# Patient Record
Sex: MALE | Race: WHITE | NOT HISPANIC OR LATINO | URBAN - METROPOLITAN AREA
[De-identification: names, ages, dates, MRNs, and addresses within clinical notes are randomized per-mention and may not be internally consistent; named-entity substitution may affect disease eponyms.]

---

## 2018-03-02 ENCOUNTER — IMPORTED ENCOUNTER (OUTPATIENT)
Dept: URBAN - METROPOLITAN AREA CLINIC 50 | Facility: CLINIC | Age: 68
End: 2018-03-02

## 2018-06-01 ENCOUNTER — IMPORTED ENCOUNTER (OUTPATIENT)
Dept: URBAN - METROPOLITAN AREA CLINIC 50 | Facility: CLINIC | Age: 68
End: 2018-06-01

## 2019-06-11 ENCOUNTER — APPOINTMENT (RX ONLY)
Dept: URBAN - METROPOLITAN AREA CLINIC 86 | Facility: CLINIC | Age: 69
Setting detail: DERMATOLOGY
End: 2019-06-11

## 2019-06-11 DIAGNOSIS — D22 MELANOCYTIC NEVI: ICD-10-CM

## 2019-06-11 DIAGNOSIS — L82.1 OTHER SEBORRHEIC KERATOSIS: ICD-10-CM

## 2019-06-11 DIAGNOSIS — L57.0 ACTINIC KERATOSIS: ICD-10-CM

## 2019-06-11 PROBLEM — D48.5 NEOPLASM OF UNCERTAIN BEHAVIOR OF SKIN: Status: ACTIVE | Noted: 2019-06-11

## 2019-06-11 PROBLEM — D22.5 MELANOCYTIC NEVI OF TRUNK: Status: ACTIVE | Noted: 2019-06-11

## 2019-06-11 PROCEDURE — ? BIOPSY BY SHAVE METHOD

## 2019-06-11 PROCEDURE — 99203 OFFICE O/P NEW LOW 30 MIN: CPT | Mod: 25

## 2019-06-11 PROCEDURE — 17000 DESTRUCT PREMALG LESION: CPT | Mod: 59

## 2019-06-11 PROCEDURE — ? LIQUID NITROGEN

## 2019-06-11 PROCEDURE — ? INVENTORY

## 2019-06-11 PROCEDURE — ? COUNSELING

## 2019-06-11 PROCEDURE — 11102 TANGNTL BX SKIN SINGLE LES: CPT

## 2019-06-11 PROCEDURE — 11103 TANGNTL BX SKIN EA SEP/ADDL: CPT

## 2019-06-11 ASSESSMENT — LOCATION SIMPLE DESCRIPTION DERM
LOCATION SIMPLE: RIGHT CHEEK
LOCATION SIMPLE: UPPER BACK
LOCATION SIMPLE: LEFT UPPER BACK

## 2019-06-11 ASSESSMENT — LOCATION DETAILED DESCRIPTION DERM
LOCATION DETAILED: LEFT MID-UPPER BACK
LOCATION DETAILED: SUPERIOR THORACIC SPINE
LOCATION DETAILED: RIGHT SUPERIOR PREAURICULAR CHEEK

## 2019-06-11 ASSESSMENT — LOCATION ZONE DERM
LOCATION ZONE: TRUNK
LOCATION ZONE: FACE

## 2019-06-11 NOTE — PROCEDURE: MIPS QUALITY
Quality 130: Documentation Of Current Medications In The Medical Record: Current Medications Documented
Detail Level: Detailed
Quality 226: Preventive Care And Screening: Tobacco Use: Screening And Cessation Intervention: Patient screened for tobacco use and is an ex/non-smoker
Quality 111:Pneumonia Vaccination Status For Older Adults: Pneumococcal Vaccination Previously Received
Quality 431: Preventive Care And Screening: Unhealthy Alcohol Use - Screening: Patient screened for unhealthy alcohol use using a single question and scores less than 2 times per year

## 2019-06-11 NOTE — PROCEDURE: BIOPSY BY SHAVE METHOD
Anesthesia Volume In Cc (Will Not Render If 0): 0.5
Silver Nitrate Text: The wound bed was treated with silver nitrate after the biopsy was performed.
Biopsy Type: H and E
Notification Instructions: Patient will be notified of biopsy results. However, patient instructed to call the office if not contacted within 2 weeks.
X Size Of Lesion In Cm: 0
Depth Of Biopsy: dermis
Curettage Text: The wound bed was treated with curettage after the biopsy was performed.
Lab: 6
Bill For Surgical Tray: no
Wound Care: Petrolatum
Consent: Written consent was obtained and risks were reviewed including but not limited to scarring, infection, bleeding, scabbing, incomplete removal, nerve damage and allergy to anesthesia.
Cryotherapy Text: The wound bed was treated with cryotherapy after the biopsy was performed.
Anesthesia Type: 1% lidocaine with epinephrine
Lab Facility: 3
Detail Level: Detailed
Electrodesiccation Text: The wound bed was treated with electrodesiccation after the biopsy was performed.
Biopsy Method: Dermablade
Hemostasis: Electrocautery
Was A Bandage Applied: Yes
Type Of Destruction Used: Curettage
Electrodesiccation And Curettage Text: The wound bed was treated with electrodesiccation and curettage after the biopsy was performed.
Billing Type: Third-Party Bill
Dressing: bandage
Post-Care Instructions: I reviewed with the patient in detail post-care instructions. Patient is to keep the biopsy site dry overnight, and then apply bacitracin twice daily until healed. Patient may apply hydrogen peroxide soaks to remove any crusting.

## 2019-07-01 ENCOUNTER — APPOINTMENT (RX ONLY)
Dept: URBAN - METROPOLITAN AREA CLINIC 86 | Facility: CLINIC | Age: 69
Setting detail: DERMATOLOGY
End: 2019-07-01

## 2019-07-01 PROBLEM — C44.311 BASAL CELL CARCINOMA OF SKIN OF NOSE: Status: ACTIVE | Noted: 2019-07-01

## 2019-07-01 PROCEDURE — 17311 MOHS 1 STAGE H/N/HF/G: CPT

## 2019-07-01 PROCEDURE — ? MOHS SURGERY

## 2019-07-01 PROCEDURE — ? PRESCRIPTION

## 2019-07-01 RX ORDER — DOXYCYCLINE 100 MG/1
TABLET, FILM COATED ORAL QD
Qty: 7 | Refills: 0 | Status: ERX | COMMUNITY
Start: 2019-07-01

## 2019-07-01 RX ORDER — IBUPROFEN 800 MG/1
TABLET ORAL
Qty: 20 | Refills: 0 | Status: ERX | COMMUNITY
Start: 2019-07-01

## 2019-07-01 RX ADMIN — IBUPROFEN: 800 TABLET ORAL at 00:00

## 2019-07-01 RX ADMIN — DOXYCYCLINE: 100 TABLET, FILM COATED ORAL at 00:00

## 2019-07-01 NOTE — PROCEDURE: MOHS SURGERY

## 2020-08-14 ENCOUNTER — IMPORTED ENCOUNTER (OUTPATIENT)
Dept: URBAN - METROPOLITAN AREA CLINIC 50 | Facility: CLINIC | Age: 70
End: 2020-08-14

## 2020-09-18 ENCOUNTER — IMPORTED ENCOUNTER (OUTPATIENT)
Dept: URBAN - METROPOLITAN AREA CLINIC 50 | Facility: CLINIC | Age: 70
End: 2020-09-18

## 2021-04-17 ASSESSMENT — VISUAL ACUITY
OD_CC: J1@ 17 IN
OD_CC: 20/30-2
OD_SC: 20/50-2
OD_CC: J3@ 14 IN
OS_SC: 20/20
OS_CC: J3@ 14 IN
OD_SC: 20/30+2
OD_BAT: 20/50
OS_BAT: 20/40-1
OD_PH: 20/50
OS_CC: J1@ 17 IN
OD_OTHER: 20/50. 20/200.
OS_CC: 20/30
OS_SC: 20/400
OS_PH: 20/50-1

## 2021-04-17 ASSESSMENT — TONOMETRY
OD_IOP_MMHG: 16
OS_IOP_MMHG: 17
OS_IOP_MMHG: 16
OS_IOP_MMHG: 16
OD_IOP_MMHG: 15
OD_IOP_MMHG: 15

## 2021-04-17 ASSESSMENT — PACHYMETRY
OD_CT_UM: 567
OS_CT_UM: 570

## 2021-09-15 ENCOUNTER — PREPPED CHART (OUTPATIENT)
Dept: URBAN - METROPOLITAN AREA CLINIC 52 | Facility: CLINIC | Age: 71
End: 2021-09-15

## 2021-09-17 ENCOUNTER — COMPREHENSIVE EXAM (OUTPATIENT)
Dept: URBAN - METROPOLITAN AREA CLINIC 52 | Facility: CLINIC | Age: 71
End: 2021-09-17

## 2021-09-17 DIAGNOSIS — H26.491: ICD-10-CM

## 2021-09-17 DIAGNOSIS — H35.363: ICD-10-CM

## 2021-09-17 DIAGNOSIS — H40.013: ICD-10-CM

## 2021-09-17 DIAGNOSIS — H35.372: ICD-10-CM

## 2021-09-17 PROCEDURE — 92014 COMPRE OPH EXAM EST PT 1/>: CPT

## 2021-09-17 PROCEDURE — 92134 CPTRZ OPH DX IMG PST SGM RTA: CPT

## 2021-09-17 RX ORDER — BRIMONIDINE TARTRATE 2 MG/MG
1 SOLUTION/ DROPS OPHTHALMIC TWICE A DAY
Start: 2021-09-17

## 2021-09-17 ASSESSMENT — VISUAL ACUITY
OS_CC: 20/30
OD_GLARE: 20/400
OD_PH: 20/40
OD_GLARE: 20/40
OD_CC: 20/50
OU_CC: J1+ @ 16 INCHES

## 2021-09-17 ASSESSMENT — TONOMETRY
OS_IOP_MMHG: 15
OD_IOP_MMHG: 15

## 2021-09-17 NOTE — PATIENT DISCUSSION
2020 HVF shows loss of peripheral vision OD, recommended at that time patient start GTT. Patient states he stopped GTT on his own. Recommend patient restart GTT OD and retake HVF OU. Patient given sample of Brimodine to use OD BID with written instructions. Will re check pressure in 1 month with testing.

## 2021-10-29 ENCOUNTER — FOLLOW UP (OUTPATIENT)
Dept: URBAN - METROPOLITAN AREA CLINIC 52 | Facility: CLINIC | Age: 71
End: 2021-10-29

## 2021-10-29 DIAGNOSIS — H40.013: ICD-10-CM

## 2021-10-29 PROCEDURE — 92133 CPTRZD OPH DX IMG PST SGM ON: CPT

## 2021-10-29 PROCEDURE — 92012 INTRM OPH EXAM EST PATIENT: CPT

## 2021-10-29 PROCEDURE — 92083 EXTENDED VISUAL FIELD XM: CPT

## 2021-10-29 ASSESSMENT — TONOMETRY
OS_IOP_MMHG: 13
OD_IOP_MMHG: 10

## 2021-10-29 NOTE — PATIENT DISCUSSION
Due to a high number of fixation losses will have patient retake HVF OU. Continue current management.

## 2022-03-29 ENCOUNTER — DIAGNOSTICS ONLY (OUTPATIENT)
Dept: URBAN - METROPOLITAN AREA CLINIC 48 | Facility: CLINIC | Age: 72
End: 2022-03-29

## 2022-03-29 DIAGNOSIS — H40.013: ICD-10-CM

## 2022-03-29 PROCEDURE — 92133 CPTRZD OPH DX IMG PST SGM ON: CPT

## 2022-03-29 PROCEDURE — 92083 EXTENDED VISUAL FIELD XM: CPT

## 2022-04-15 ENCOUNTER — FOLLOW UP (OUTPATIENT)
Dept: URBAN - METROPOLITAN AREA CLINIC 52 | Facility: CLINIC | Age: 72
End: 2022-04-15

## 2022-04-15 DIAGNOSIS — H40.013: ICD-10-CM

## 2022-04-15 PROCEDURE — 92012 INTRM OPH EXAM EST PATIENT: CPT

## 2022-04-15 ASSESSMENT — VISUAL ACUITY
OD_CC: 20/25
OS_CC: 20/25

## 2022-04-15 ASSESSMENT — TONOMETRY
OD_IOP_MMHG: 11
OS_IOP_MMHG: 13

## 2022-12-05 ENCOUNTER — APPOINTMENT (RX ONLY)
Dept: URBAN - METROPOLITAN AREA CLINIC 86 | Facility: CLINIC | Age: 72
Setting detail: DERMATOLOGY
End: 2022-12-05

## 2022-12-05 DIAGNOSIS — L81.4 OTHER MELANIN HYPERPIGMENTATION: ICD-10-CM

## 2022-12-05 DIAGNOSIS — D22 MELANOCYTIC NEVI: ICD-10-CM

## 2022-12-05 DIAGNOSIS — D18.0 HEMANGIOMA: ICD-10-CM

## 2022-12-05 DIAGNOSIS — L82.1 OTHER SEBORRHEIC KERATOSIS: ICD-10-CM

## 2022-12-05 DIAGNOSIS — Z85.828 PERSONAL HISTORY OF OTHER MALIGNANT NEOPLASM OF SKIN: ICD-10-CM

## 2022-12-05 DIAGNOSIS — L57.8 OTHER SKIN CHANGES DUE TO CHRONIC EXPOSURE TO NONIONIZING RADIATION: ICD-10-CM

## 2022-12-05 DIAGNOSIS — L57.0 ACTINIC KERATOSIS: ICD-10-CM

## 2022-12-05 PROBLEM — D48.5 NEOPLASM OF UNCERTAIN BEHAVIOR OF SKIN: Status: ACTIVE | Noted: 2022-12-05

## 2022-12-05 PROBLEM — D22.5 MELANOCYTIC NEVI OF TRUNK: Status: ACTIVE | Noted: 2022-12-05

## 2022-12-05 PROBLEM — D18.01 HEMANGIOMA OF SKIN AND SUBCUTANEOUS TISSUE: Status: ACTIVE | Noted: 2022-12-05

## 2022-12-05 PROCEDURE — ? FULL BODY SKIN EXAM

## 2022-12-05 PROCEDURE — 99203 OFFICE O/P NEW LOW 30 MIN: CPT

## 2022-12-05 PROCEDURE — ? SUNSCREEN RECOMMENDATIONS

## 2022-12-05 PROCEDURE — ? TREATMENT REGIMEN

## 2022-12-05 PROCEDURE — ? COUNSELING

## 2022-12-05 PROCEDURE — ? DEFER

## 2022-12-05 ASSESSMENT — LOCATION ZONE DERM
LOCATION ZONE: NOSE
LOCATION ZONE: NOSE
LOCATION ZONE: ARM
LOCATION ZONE: TRUNK
LOCATION ZONE: ARM

## 2022-12-05 ASSESSMENT — LOCATION DETAILED DESCRIPTION DERM
LOCATION DETAILED: UMBILICUS
LOCATION DETAILED: LEFT NASAL ALA
LOCATION DETAILED: LEFT DISTAL DORSAL FOREARM
LOCATION DETAILED: LEFT DISTAL DORSAL FOREARM
LOCATION DETAILED: RIGHT MEDIAL SUPERIOR CHEST
LOCATION DETAILED: LEFT SUPERIOR MEDIAL LOWER BACK
LOCATION DETAILED: EPIGASTRIC SKIN
LOCATION DETAILED: LEFT NASAL ALA

## 2022-12-05 ASSESSMENT — LOCATION SIMPLE DESCRIPTION DERM
LOCATION SIMPLE: LEFT LOWER BACK
LOCATION SIMPLE: LEFT FOREARM
LOCATION SIMPLE: ABDOMEN
LOCATION SIMPLE: LEFT FOREARM
LOCATION SIMPLE: CHEST
LOCATION SIMPLE: LEFT NOSE
LOCATION SIMPLE: LEFT NOSE

## 2022-12-05 NOTE — PROCEDURE: DEFER
X Size Of Lesion In Cm (Optional): 0
Introduction Text (Please End With A Colon): The following procedure was deferred:
Detail Level: Detailed
Instructions (Optional): Patient signed refusal for biopsy today. He understands that we will continue to monitor with future skin checks, and if symptoms get worse to call office for appointment to have treated.
Procedure To Be Performed At Next Visit: Cryotherapy

## 2022-12-23 ENCOUNTER — COMPREHENSIVE EXAM (OUTPATIENT)
Dept: URBAN - METROPOLITAN AREA CLINIC 52 | Facility: CLINIC | Age: 72
End: 2022-12-23

## 2022-12-23 PROCEDURE — 92012 INTRM OPH EXAM EST PATIENT: CPT

## 2022-12-23 PROCEDURE — 92134 CPTRZ OPH DX IMG PST SGM RTA: CPT

## 2022-12-23 ASSESSMENT — VISUAL ACUITY
OD_GLARE: 20/25
OD_CC: 20/25+2
OU_CC: J1 @ 14"
OS_GLARE: 20/30
OS_GLARE: 20/25
OD_GLARE: 20/30
OS_CC: 20/25

## 2022-12-23 ASSESSMENT — TONOMETRY
OD_IOP_MMHG: 12
OS_IOP_MMHG: 13

## 2023-09-26 ENCOUNTER — APPOINTMENT (RX ONLY)
Dept: URBAN - METROPOLITAN AREA CLINIC 86 | Facility: CLINIC | Age: 73
Setting detail: DERMATOLOGY
End: 2023-09-26

## 2023-09-26 PROBLEM — C44.319 BASAL CELL CARCINOMA OF SKIN OF OTHER PARTS OF FACE: Status: ACTIVE | Noted: 2023-09-26

## 2023-09-26 PROCEDURE — 99213 OFFICE O/P EST LOW 20 MIN: CPT

## 2023-09-26 PROCEDURE — ? SURGICAL DECISION MAKING

## 2023-09-26 PROCEDURE — ? DEFER

## 2023-09-26 PROCEDURE — ? COUNSELING

## 2023-09-26 NOTE — PROCEDURE: DEFER
Size Of Lesion In Cm (Optional): 0
Introduction Text (Please End With A Colon): The following procedure was deferred:
Detail Level: Detailed
Procedure To Be Performed At Next Visit: Mohs surgery

## 2023-09-26 NOTE — PROCEDURE: SURGICAL DECISION MAKING
Initial Decision For Surgery?: Yes
Risk Assessment Explanation (Does Not Render In The Note): Clinical determination of the probability and/or consequences of an event, such as surgery. Clinical assessment of the level of risk is affected by the nature of the event under consideration for the patient. Modifier 57 is used to indicate an Evaluation and Management (E/M) service resulted in the initial decision to perform surgery either the day before a major surgery (90 day global) or the day of a major surgery.
Identified Risk Factors Documented?: no
Complexity (Necessary For Coding; Major - 90 Day Global With Some Exceptions; Minor - 10 Day Global): minor

## 2023-10-09 ENCOUNTER — APPOINTMENT (RX ONLY)
Dept: URBAN - METROPOLITAN AREA CLINIC 86 | Facility: CLINIC | Age: 73
Setting detail: DERMATOLOGY
End: 2023-10-09

## 2023-10-09 VITALS — DIASTOLIC BLOOD PRESSURE: 66 MMHG | RESPIRATION RATE: 16 BRPM | SYSTOLIC BLOOD PRESSURE: 120 MMHG | HEART RATE: 72 BPM

## 2023-10-09 VITALS — DIASTOLIC BLOOD PRESSURE: 66 MMHG | RESPIRATION RATE: 16 BRPM | HEART RATE: 73 BPM | SYSTOLIC BLOOD PRESSURE: 120 MMHG

## 2023-10-09 PROBLEM — C44.319 BASAL CELL CARCINOMA OF SKIN OF OTHER PARTS OF FACE: Status: ACTIVE | Noted: 2023-10-09

## 2023-10-09 PROCEDURE — ? ADDITIONAL NOTES

## 2023-10-09 PROCEDURE — 13132 CMPLX RPR F/C/C/M/N/AX/G/H/F: CPT

## 2023-10-09 PROCEDURE — ? MOHS SURGERY

## 2023-10-09 PROCEDURE — ? CONSULTATION FOR SURGICAL REPAIR

## 2023-10-09 PROCEDURE — 17312 MOHS ADDL STAGE: CPT

## 2023-10-09 PROCEDURE — 17311 MOHS 1 STAGE H/N/HF/G: CPT

## 2023-10-09 PROCEDURE — ? REPAIR NOTE

## 2023-10-09 NOTE — PROCEDURE: MOHS SURGERY
at home: Manual Repair Warning Statement: We plan on removing the manually selected variable below in favor of our much easier automatic structured text blocks found in the previous tab. We decided to do this to help make the flow better and give you the full power of structured data. Manual selection is never going to be ideal in our platform and I would encourage you to avoid using manual selection from this point on, especially since I will be sunsetting this feature. It is important that you do one of two things with the customized text below. First, you can save all of the text in a word file so you can have it for future reference. Second, transfer the text to the appropriate area in the Library tab. Lastly, if there is a flap or graft type which we do not have you need to let us know right away so I can add it in before the variable is hidden. No need to panic, we plan to give you roughly 6 months to make the change.

## 2023-10-09 NOTE — PROCEDURE: REPAIR NOTE
Additional Notes: Recommended patient to get Compressions Stockings Render Risk Assessment In Note?: no Detail Level: Simple Split-Thickness Skin Graft Text: The defect edges were debeveled with a #15 scalpel blade.  Given the location of the defect, shape of the defect and the proximity to free margins a split thickness skin graft was deemed most appropriate.  Using a sterile surgical marker, the primary defect shape was transferred to the donor site. The split thickness graft was then harvested.  The skin graft was then placed in the primary defect and oriented appropriately.

## 2023-10-09 NOTE — PROCEDURE: REPAIR NOTE
History & Physical    SUBJECTIVE:     History of Present Illness:  Patient is a 66 y.o. male presents with rectal cancer s/p neoadjuvant chemo and XRT and here now for lap LAR.     Chief Complaint   Patient presents with    Follow-up       Review of patient's allergies indicates:  No Known Allergies    Current Outpatient Medications   Medication Sig Dispense Refill    aspirin (ECOTRIN) 81 MG EC tablet Take 81 mg by mouth once daily.      atorvastatin (LIPITOR) 10 MG tablet TAKE 1 TABLET BY MOUTH EVERY BEDTIME  4    docusate sodium (COLACE) 100 MG capsule Take 100 mg by mouth 2 (two) times daily.      escitalopram oxalate (LEXAPRO) 10 MG tablet Take 10 mg by mouth once daily.      ferrous sulfate 325 mg (65 mg iron) Tab tablet Take 1 tablet by mouth once daily.  0    hydroCHLOROthiazide (HYDRODIURIL) 25 MG tablet Take 25 mg by mouth every morning.  4    levetiracetam (KEPPRA) 500 MG Tab Take 500 mg by mouth 2 (two) times daily.      lisinopril (PRINIVIL,ZESTRIL) 5 MG tablet Take 5 mg by mouth once daily.      multivit-min/FA/lycopen/lutein (CENTRUM SILVER ULTRA MEN'S ORAL) Take 1 tablet by mouth once daily.      omeprazole (PRILOSEC) 20 MG capsule Take by mouth daily as needed.  4    oxyCODONE-acetaminophen (PERCOCET) 5-325 mg per tablet Take 1 tablet by mouth every 4 (four) hours as needed for Pain. 30 tablet 0    potassium chloride (MICRO-K) 10 MEQ CpSR Take 10 mEq by mouth once.      SYMBICORT 160-4.5 mcg/actuation HFAA 2 puffs 2 (two) times daily.  2    VENTOLIN HFA 90 mcg/actuation inhaler Inhale 2 puffs into the lungs every 4 (four) hours as needed.  4    ondansetron (ZOFRAN) 8 MG tablet Take 1 tablet (8 mg total) by mouth every 8 (eight) hours as needed for Nausea. 30 tablet 1     No current facility-administered medications for this visit.        Past Medical History:   Diagnosis Date    Aphagia     Cancer     prostate & colon    Cardiomegaly     Colon cancer     rectal cancer     "Dysphagia     Hypertension     Prostate cancer     Seizures     Subdural hemorrhage      Past Surgical History:   Procedure Laterality Date    brain coiling      COLECTOMY,LAPAROSCOPIC N/A 9/10/2018    [facility]    COLON SURGERY      Exam under anesthesia N/A 10/30/2018    [facility]    [id number] N/A 11/26/2018    [facility]    pilonadal cyst      PROSTATE SURGERY      TONSILLECTOMY      TRACHEOSTOMY TUBE PLACEMENT       Family History   Problem Relation Age of Onset    Aneurysm Sister      Social History     Tobacco Use    Smoking status: Current Every Day Smoker     Types: Cigars    Smokeless tobacco: Never Used    Tobacco comment: one daily   Substance Use Topics    Alcohol use: Yes     Comment: occassional    Drug use: No        Review of Systems:  Review of Systems   Constitutional: Negative.    HENT: Negative.    Eyes: Negative.    Respiratory: Negative.    Cardiovascular: Negative.    Gastrointestinal: Negative.    Endocrine: Negative.    Musculoskeletal: Negative.    Skin: Negative.    Allergic/Immunologic: Negative.    Neurological: Negative.    Hematological: Negative.    Psychiatric/Behavioral: Negative.    All other systems reviewed and are negative.      OBJECTIVE:     Vital Signs (Most Recent)  Pulse: 80 (02/06/19 1048)  BP: 107/66 (02/06/19 1048)  5' 4" (1.626 m)  98 kg (216 lb)     Physical Exam:  Physical Exam   Constitutional: He is oriented to person, place, and time. He appears well-developed and well-nourished.   HENT:   Head: Normocephalic and atraumatic.   Right Ear: External ear normal.   Left Ear: External ear normal.   Nose: Nose normal.   Mouth/Throat: Oropharynx is clear and moist.   Eyes: Conjunctivae and EOM are normal. Pupils are equal, round, and reactive to light.   Neck: Normal range of motion. Neck supple.   Cardiovascular: Normal rate, regular " rhythm, normal heart sounds and intact distal pulses.   Pulmonary/Chest: Effort normal and breath sounds normal.   Abdominal: Soft. Bowel sounds are normal.   Musculoskeletal: Normal range of motion.   Neurological: He is alert and oriented to person, place, and time. He has normal reflexes.   Skin: Skin is warm and dry.   Psychiatric: He has a normal mood and affect. His behavior is normal. Thought content normal.   Vitals reviewed.      Laboratory  none    Diagnostic Results:  CT: Reviewed  no mets and a low rectal cancer    ASSESSMENT/PLAN:     Rectal cancer    PLAN:Plan     To the OR for lap LAR possible APR with the risks and possible complications explained and he agrees to proceed as planned        Double O-Z Plasty Text: The defect edges were debeveled with a #15 scalpel blade.  Given the location of the defect, shape of the defect and the proximity to free margins a Double O-Z plasty (double transposition flap) was deemed most appropriate.  Using a sterile surgical marker, the appropriate transposition flaps were drawn incorporating the defect and placing the expected incisions within the relaxed skin tension lines where possible. The area thus outlined was incised deep to adipose tissue with a #15 scalpel blade.  The skin margins were undermined to an appropriate distance in all directions utilizing iris scissors.  Hemostasis was achieved with electrocautery.  The flaps were then transposed into place, one clockwise and the other counterclockwise, and anchored with interrupted buried subcutaneous sutures.

## 2023-10-09 NOTE — PROCEDURE: ADDITIONAL NOTES
no
Additional Notes: Patient consent was obtained to proceed with the visit and recommended plan of care after discussion of all risks and benefits, including the risks of COVID-19 exposure.
Detail Level: Simple

## 2023-10-09 NOTE — PROCEDURE: MOHS SURGERY
A catheter (Catheter 145d Pgtl Crv 6fr .051in . 042in 110cm Sup)  was used to cross the aortic valve and perform left ventriculography by power injection. Multiple views were taken. Otolaryngologist Procedure Text (A): After obtaining clear surgical margins the patient was sent to otolaryngology for surgical repair.  The patient understands they will receive post-surgical care and follow-up from the referring physician's office.

## 2023-10-09 NOTE — PROCEDURE: MOHS SURGERY
I have personally evaluated and examined the patient. The Attending was available to me as a supervising provider if needed. Bilateral Helical Rim Advancement Flap Text: The defect edges were debeveled with a #15 blade scalpel.  Given the location of the defect and the proximity to free margins (helical rim) a bilateral helical rim advancement flap was deemed most appropriate.  Using a sterile surgical marker, the appropriate advancement flaps were drawn incorporating the defect and placing the expected incisions between the helical rim and antihelix where possible.  The area thus outlined was incised through and through with a #15 scalpel blade.  With a skin hook and iris scissors, the flaps were gently and sharply undermined and freed up.

## 2023-10-09 NOTE — PROCEDURE: REPAIR NOTE
Island Pedicle Flap With Canthal Suspension Text: The defect edges were debeveled with a #15 scalpel blade.  Given the location of the defect, shape of the defect and the proximity to free margins an island pedicle advancement flap was deemed most appropriate.  Using a sterile surgical marker, an appropriate advancement flap was drawn incorporating the defect, outlining the appropriate donor tissue and placing the expected incisions within the relaxed skin tension lines where possible. The area thus outlined was incised deep to adipose tissue with a #15 scalpel blade.  The skin margins were undermined to an appropriate distance in all directions around the primary defect and laterally outward around the island pedicle utilizing iris scissors.  There was minimal undermining beneath the pedicle flap. A suspension suture was placed in the canthal tendon to prevent tension and prevent ectropion. other

## 2023-10-09 NOTE — PROCEDURE: CONSULTATION FOR SURGICAL REPAIR
Size Of Defect: 1.6
X Size Of Defect In Cm (Optional): 1.1
Detail Level: Detailed
Referring Provider (Optional): Dr. Thakkar

## 2023-11-13 ENCOUNTER — APPOINTMENT (RX ONLY)
Dept: URBAN - METROPOLITAN AREA CLINIC 86 | Facility: CLINIC | Age: 73
Setting detail: DERMATOLOGY
End: 2023-11-13

## 2023-11-13 DIAGNOSIS — Z48.817 ENCOUNTER FOR SURGICAL AFTERCARE FOLLOWING SURGERY ON THE SKIN AND SUBCUTANEOUS TISSUE: ICD-10-CM

## 2023-11-13 PROCEDURE — 99024 POSTOP FOLLOW-UP VISIT: CPT

## 2023-11-13 PROCEDURE — ? TREATMENT REGIMEN

## 2023-11-13 PROCEDURE — ? POST-OP WOUND CHECK

## 2023-11-13 ASSESSMENT — LOCATION ZONE DERM: LOCATION ZONE: FACE

## 2023-11-13 ASSESSMENT — LOCATION DETAILED DESCRIPTION DERM: LOCATION DETAILED: LEFT CENTRAL MALAR CHEEK

## 2023-11-13 ASSESSMENT — LOCATION SIMPLE DESCRIPTION DERM: LOCATION SIMPLE: LEFT CHEEK

## 2023-11-13 NOTE — PROCEDURE: POST-OP WOUND CHECK
Detail Level: Detailed
Add 75314 Cpt? (Important Note: In 2017 The Use Of 44089 Is Being Tracked By Cms To Determine Future Global Period Reimbursement For Global Periods): yes
Wound Evaluated By: Lupe

## 2024-01-05 ENCOUNTER — COMPREHENSIVE EXAM (OUTPATIENT)
Dept: URBAN - METROPOLITAN AREA CLINIC 52 | Facility: CLINIC | Age: 74
End: 2024-01-05

## 2024-01-05 DIAGNOSIS — H35.361: ICD-10-CM

## 2024-01-05 DIAGNOSIS — H35.372: ICD-10-CM

## 2024-01-05 DIAGNOSIS — H40.013: ICD-10-CM

## 2024-01-05 PROCEDURE — 92014 COMPRE OPH EXAM EST PT 1/>: CPT

## 2024-01-05 PROCEDURE — 76514 ECHO EXAM OF EYE THICKNESS: CPT

## 2024-01-05 ASSESSMENT — TONOMETRY
OD_IOP_MMHG: 10
OD_IOP_MMHG: 11
OS_IOP_MMHG: 10
OS_IOP_MMHG: 12

## 2024-01-05 ASSESSMENT — VISUAL ACUITY
OD_CC: 20/20-2
OD_GLARE: 20/30
OU_CC: J1
OD_GLARE: 20/25-2
OS_CC: 20/25-2
OS_GLARE: 20/30
OS_GLARE: 20/25-1

## 2024-01-05 ASSESSMENT — PACHYMETRY
OS_CT_UM: 580
OD_CT_UM: 559

## 2024-05-13 ENCOUNTER — APPOINTMENT (RX ONLY)
Dept: URBAN - METROPOLITAN AREA CLINIC 86 | Facility: CLINIC | Age: 74
Setting detail: DERMATOLOGY
End: 2024-05-13

## 2024-05-13 DIAGNOSIS — L72.0 EPIDERMAL CYST: ICD-10-CM

## 2024-05-13 DIAGNOSIS — L08.9 LOCAL INFECTION OF THE SKIN AND SUBCUTANEOUS TISSUE, UNSPECIFIED: ICD-10-CM

## 2024-05-13 PROCEDURE — ? COUNSELING

## 2024-05-13 PROCEDURE — ? ADDITIONAL NOTES

## 2024-05-13 PROCEDURE — 99212 OFFICE O/P EST SF 10 MIN: CPT | Mod: 25

## 2024-05-13 PROCEDURE — 10061 I&D ABSCESS COMP/MULTIPLE: CPT

## 2024-05-13 PROCEDURE — ? ORDER TESTS

## 2024-05-13 PROCEDURE — ? INCISION AND DRAINAGE

## 2024-05-13 ASSESSMENT — LOCATION DETAILED DESCRIPTION DERM
LOCATION DETAILED: SUPERIOR THORACIC SPINE
LOCATION DETAILED: SUPERIOR THORACIC SPINE

## 2024-05-13 ASSESSMENT — LOCATION ZONE DERM
LOCATION ZONE: TRUNK
LOCATION ZONE: TRUNK

## 2024-05-13 ASSESSMENT — LOCATION SIMPLE DESCRIPTION DERM
LOCATION SIMPLE: UPPER BACK
LOCATION SIMPLE: UPPER BACK

## 2024-05-13 NOTE — PROCEDURE: ORDER TESTS
Bill For Surgical Tray: no
Billing Type: Third-Party Bill
Performing Laboratory: -[id number]
Expected Date Of Service: 05/13/2024

## 2024-05-13 NOTE — PROCEDURE: ADDITIONAL NOTES
Detail Level: Detailed
Additional Notes: Will wait for culture to rx antibiotics, patients just completed a round of cephalexin
Render Risk Assessment In Note?: yes

## 2024-05-13 NOTE — PROCEDURE: INCISION AND DRAINAGE
Detail Level: Detailed
Lesion Type: Cyst
I&D Type: complicated
Curette: No
Anesthesia Type: 1% lidocaine with epinephrine
Anesthesia Volume In Cc: 3
Size Of Lesion In Cm (Optional But May Be Required For Some Insurances): 0
Wound Care: Petrolatum
Dressing: dry sterile dressing
Curette Text (Optional): After the contents were expressed a curette was used to partially remove the cyst wall.
Epidermal Sutures: 4-0 Ethilon
Epidermal Closure: simple interrupted
Suture Text: The incision was partially closed with
Consent was obtained and risks were reviewed including but not limited to delayed wound healing, infection, need for multiple I and D's, and pain.
Post-Care Instructions: I reviewed with the patient in detail post-care instructions. Patient should keep wound covered and call the office should any redness, pain, swelling or worsening occur.

## 2024-05-13 NOTE — HPI: CYST
How Severe Is Your Cyst?: severe
Is This A New Presentation, Or A Follow-Up?: Cysts
Additional History: Patient states he went to urgent care 2 weeks ago and they lanced the lesion, drained it and prescribed a round of antibiotics. Patient states he took full course of antibiotics and the cyst is still draining and painful. He wants it removed asap.

## 2024-06-04 ENCOUNTER — APPOINTMENT (RX ONLY)
Dept: URBAN - METROPOLITAN AREA CLINIC 86 | Facility: CLINIC | Age: 74
Setting detail: DERMATOLOGY
End: 2024-06-04

## 2024-06-04 DIAGNOSIS — L72.0 EPIDERMAL CYST: ICD-10-CM | Status: RESOLVED

## 2024-06-04 PROCEDURE — 99212 OFFICE O/P EST SF 10 MIN: CPT

## 2024-06-04 PROCEDURE — ? COUNSELING

## 2024-06-04 ASSESSMENT — LOCATION DETAILED DESCRIPTION DERM
LOCATION DETAILED: SUPERIOR THORACIC SPINE
LOCATION DETAILED: SUPERIOR THORACIC SPINE

## 2024-06-04 ASSESSMENT — LOCATION ZONE DERM
LOCATION ZONE: TRUNK
LOCATION ZONE: TRUNK

## 2024-06-04 ASSESSMENT — LOCATION SIMPLE DESCRIPTION DERM
LOCATION SIMPLE: UPPER BACK
LOCATION SIMPLE: UPPER BACK

## 2024-07-26 ENCOUNTER — FOLLOW UP (OUTPATIENT)
Dept: URBAN - METROPOLITAN AREA CLINIC 52 | Facility: CLINIC | Age: 74
End: 2024-07-26

## 2024-07-26 DIAGNOSIS — H40.013: ICD-10-CM

## 2024-07-26 PROCEDURE — 92083 EXTENDED VISUAL FIELD XM: CPT

## 2024-07-26 PROCEDURE — 92133 CPTRZD OPH DX IMG PST SGM ON: CPT

## 2024-07-26 PROCEDURE — 99214 OFFICE O/P EST MOD 30 MIN: CPT

## 2024-07-26 ASSESSMENT — TONOMETRY
OS_IOP_MMHG: 09
OS_IOP_MMHG: 11
OD_IOP_MMHG: 11
OD_IOP_MMHG: 10

## 2024-07-26 ASSESSMENT — VISUAL ACUITY
OS_CC: 20/25-1
OD_GLARE: 20/30
OD_GLARE: 20/25
OU_CC: 20/20
OD_CC: 20/20

## 2024-09-05 NOTE — PROCEDURE: MOHS SURGERY
83 Nasal Turnover Hinge Flap Text: The defect edges were debeveled with a #15 scalpel blade.  Given the size, depth, location of the defect and the defect being full thickness a nasal turnover hinge flap was deemed most appropriate.  Using a sterile surgical marker, an appropriate hinge flap was drawn incorporating the defect. The area thus outlined was incised with a #15 scalpel blade. The flap was designed to recreate the nasal mucosal lining and the alar rim. The skin margins were undermined to an appropriate distance in all directions utilizing iris scissors.